# Patient Record
Sex: MALE | Race: OTHER | Employment: FULL TIME | URBAN - METROPOLITAN AREA
[De-identification: names, ages, dates, MRNs, and addresses within clinical notes are randomized per-mention and may not be internally consistent; named-entity substitution may affect disease eponyms.]

---

## 2017-06-30 ENCOUNTER — OFFICE VISIT (OUTPATIENT)
Dept: FAMILY MEDICINE CLINIC | Facility: CLINIC | Age: 47
End: 2017-06-30

## 2017-06-30 VITALS
HEIGHT: 65 IN | TEMPERATURE: 98 F | DIASTOLIC BLOOD PRESSURE: 86 MMHG | WEIGHT: 168 LBS | SYSTOLIC BLOOD PRESSURE: 132 MMHG | BODY MASS INDEX: 27.99 KG/M2 | HEART RATE: 72 BPM

## 2017-06-30 DIAGNOSIS — G51.0 BELL'S PALSY: Primary | ICD-10-CM

## 2017-06-30 PROCEDURE — 99212 OFFICE O/P EST SF 10 MIN: CPT | Performed by: FAMILY MEDICINE

## 2017-06-30 PROCEDURE — 99204 OFFICE O/P NEW MOD 45 MIN: CPT | Performed by: FAMILY MEDICINE

## 2017-06-30 RX ORDER — ACYCLOVIR 800 MG/1
800 TABLET ORAL 2 TIMES DAILY
COMMUNITY
End: 2017-07-10

## 2017-06-30 RX ORDER — METHYLPREDNISOLONE 4 MG/1
4 TABLET ORAL DAILY
COMMUNITY
End: 2017-07-10

## 2017-06-30 RX ORDER — IBUPROFEN 600 MG/1
600 TABLET ORAL EVERY 6 HOURS PRN
Qty: 60 TABLET | Refills: 0 | Status: SHIPPED | OUTPATIENT
Start: 2017-06-30 | End: 2017-08-19 | Stop reason: ALTCHOICE

## 2017-06-30 NOTE — PROGRESS NOTES
6/30/2017  12:04 PM    Sakshi Dolan is a 41year old male. Chief complaint(s): Patient presents with:  ER F/U: ER follow up for Bell's palsy    HPI:     Sakshi Dolan primary complaint is regarding Bell's palsy.      Patient is a 46-year-old male who presents pain and palpitations. Gastrointestinal: Negative for nausea, vomiting, abdominal pain, diarrhea and constipation. Musculoskeletal: Negative for back pain and neck pain. Skin: Negative for rash.    Neurological: Positive for facial asymmetry, numbness for Pain. Always take it with food. REFERRALS: 1PHYSICAL THERAPY - INTERNAL,       PHYSICAL THERAPY - at [facility].       RECOMMENDATIONS given include: Please, call our office with any questions or concerns.  Notify Dr Juliocesar Bhagat or the [employer]

## 2017-07-10 ENCOUNTER — OFFICE VISIT (OUTPATIENT)
Dept: FAMILY MEDICINE CLINIC | Facility: CLINIC | Age: 47
End: 2017-07-10

## 2017-07-10 ENCOUNTER — TELEPHONE (OUTPATIENT)
Dept: INTERNAL MEDICINE CLINIC | Facility: CLINIC | Age: 47
End: 2017-07-10

## 2017-07-10 VITALS
WEIGHT: 168 LBS | DIASTOLIC BLOOD PRESSURE: 73 MMHG | HEIGHT: 65 IN | SYSTOLIC BLOOD PRESSURE: 118 MMHG | TEMPERATURE: 98 F | HEART RATE: 70 BPM | BODY MASS INDEX: 27.99 KG/M2

## 2017-07-10 DIAGNOSIS — G51.0 BELL'S PALSY: Primary | ICD-10-CM

## 2017-07-10 DIAGNOSIS — F51.01 PRIMARY INSOMNIA: ICD-10-CM

## 2017-07-10 PROCEDURE — 99214 OFFICE O/P EST MOD 30 MIN: CPT | Performed by: FAMILY MEDICINE

## 2017-07-10 PROCEDURE — 99212 OFFICE O/P EST SF 10 MIN: CPT | Performed by: FAMILY MEDICINE

## 2017-07-10 RX ORDER — TRAMADOL HYDROCHLORIDE 50 MG/1
50 TABLET ORAL EVERY 6 HOURS PRN
Qty: 60 TABLET | Refills: 0 | Status: SHIPPED | OUTPATIENT
Start: 2017-07-10 | End: 2017-08-19 | Stop reason: ALTCHOICE

## 2017-07-10 RX ORDER — TEMAZEPAM 30 MG/1
30 CAPSULE ORAL NIGHTLY PRN
Qty: 30 CAPSULE | Refills: 0 | Status: SHIPPED | OUTPATIENT
Start: 2017-07-10 | End: 2017-08-19 | Stop reason: ALTCHOICE

## 2017-07-10 NOTE — TELEPHONE ENCOUNTER
Actions Requested:   #[id number]. Diagnosed with Bell's Palsy, still cannot close the right eye, and he needs more days off work. Also having pain in right shoulder and ear. Appointment given today at 3:15 with Dr. Mary Radford.   Problem:  Diagnosed arm, or leg on one side of the body, numbness of the face, arm, or leg on one side of the body, loss of speech or garbled speech  * Sounds like a life-threatening emergency to the triager  * Headache (with neurologic deficit)  * Can't use hand normally (e.

## 2017-07-10 NOTE — PROGRESS NOTES
7/10/2017  4:28 PM    Arpita Castillo is a 41year old male. Chief complaint(s): Patient presents with:  Bell's Palsy: follow up. Pain: behind head and ear. R     HPI:     Arpita Castillo primary complaint is regarding Bell's palsy.      Patient is a 47-year-old Negative for chills, fatigue and fever. HENT: Negative for rhinorrhea and sore throat. Respiratory: Negative for cough, shortness of breath and wheezing. Cardiovascular: Negative for chest pain and palpitations.    Gastrointestinal: Negative for cece tablet 0      Sig: Take 1 tablet (50 mg total) by mouth every 6 (six) hours as needed for Pain.      temazepam (RESTORIL) 30 MG Oral Cap 30 capsule 0      Sig: Take 1 capsule (30 mg total) by mouth nightly as needed for Sleep.          REFERRALS: Follow up

## 2017-07-24 ENCOUNTER — OFFICE VISIT (OUTPATIENT)
Dept: FAMILY MEDICINE CLINIC | Facility: CLINIC | Age: 47
End: 2017-07-24

## 2017-07-24 VITALS
DIASTOLIC BLOOD PRESSURE: 66 MMHG | HEART RATE: 73 BPM | BODY MASS INDEX: 28.16 KG/M2 | HEIGHT: 65 IN | WEIGHT: 169 LBS | TEMPERATURE: 99 F | SYSTOLIC BLOOD PRESSURE: 113 MMHG

## 2017-07-24 DIAGNOSIS — G51.0 BELL'S PALSY: Primary | ICD-10-CM

## 2017-07-24 PROCEDURE — 99214 OFFICE O/P EST MOD 30 MIN: CPT | Performed by: FAMILY MEDICINE

## 2017-07-24 PROCEDURE — 99212 OFFICE O/P EST SF 10 MIN: CPT | Performed by: FAMILY MEDICINE

## 2017-07-24 NOTE — PROGRESS NOTES
7/24/2017  2:57 PM    Gume Townsend is a 41year old male. Chief complaint(s): Patient presents with:  Note For Work: Patient requesting a note to return to work    HPI:     Gume Townsend primary complaint is regarding facial paralysis.      Patient is a 45-ye food. Disp: 60 tablet Rfl: 0       Allergies:  No Known Allergies      ROS:   Review of Systems   Constitutional: Negative for chills, fatigue and fever. HENT: Negative for rhinorrhea and sore throat.     Respiratory: Negative for cough, shortness of riya encounter         REFERRALS: continue with physical therapy. RECOMMENDATIONS given include: Please, call our office with any questions or concerns.  Notify Dr Erickson Kowalski or the [address] [place] [address] Angelita Guerrero if there is a deterioration or worsening of the medical cond

## 2017-08-18 ENCOUNTER — MED REC SCAN ONLY (OUTPATIENT)
Dept: FAMILY MEDICINE CLINIC | Facility: CLINIC | Age: 47
End: 2017-08-18

## 2017-08-19 ENCOUNTER — OFFICE VISIT (OUTPATIENT)
Dept: FAMILY MEDICINE CLINIC | Facility: CLINIC | Age: 47
End: 2017-08-19

## 2017-08-19 VITALS
WEIGHT: 168 LBS | BODY MASS INDEX: 27.99 KG/M2 | HEIGHT: 65 IN | HEART RATE: 71 BPM | SYSTOLIC BLOOD PRESSURE: 107 MMHG | DIASTOLIC BLOOD PRESSURE: 76 MMHG | TEMPERATURE: 98 F

## 2017-08-19 DIAGNOSIS — G51.0 BELL'S PALSY: Primary | ICD-10-CM

## 2017-08-19 PROCEDURE — 99214 OFFICE O/P EST MOD 30 MIN: CPT | Performed by: FAMILY MEDICINE

## 2017-08-19 PROCEDURE — 99212 OFFICE O/P EST SF 10 MIN: CPT | Performed by: FAMILY MEDICINE

## 2017-08-19 NOTE — PROGRESS NOTES
8/19/2017  10:02 AM    Valerio Colby is a 41year old male. Chief complaint(s): Patient presents with: Follow - Up: Patient here to f/u on his Bell's Palsy. Bell's Palsy    HPI:     Valerio Colby primary complaint is regarding Bell's palsy.      Patient is Negative for chest pain and palpitations. Gastrointestinal: Negative for nausea, vomiting, abdominal pain, diarrhea and constipation. Musculoskeletal: Negative for back pain and neck pain. Skin: Negative for rash.    Neurological: Negative for seizure glasses. FOLLOW-UP: Schedule a follow-up visit in 4 weeks. Orders This Visit:  No orders of the defined types were placed in this encounter.       Meds This Visit:    No prescriptions requested or ordered in this encounter    Imaging & Referrals:  P

## 2017-09-18 ENCOUNTER — OFFICE VISIT (OUTPATIENT)
Dept: FAMILY MEDICINE CLINIC | Facility: CLINIC | Age: 47
End: 2017-09-18

## 2017-09-18 VITALS
WEIGHT: 168 LBS | BODY MASS INDEX: 27.99 KG/M2 | TEMPERATURE: 98 F | DIASTOLIC BLOOD PRESSURE: 70 MMHG | HEART RATE: 71 BPM | SYSTOLIC BLOOD PRESSURE: 123 MMHG | RESPIRATION RATE: 16 BRPM | HEIGHT: 65 IN

## 2017-09-18 DIAGNOSIS — G51.0 BELL'S PALSY: Primary | ICD-10-CM

## 2017-09-18 PROCEDURE — 99212 OFFICE O/P EST SF 10 MIN: CPT | Performed by: FAMILY MEDICINE

## 2017-09-18 PROCEDURE — 99214 OFFICE O/P EST MOD 30 MIN: CPT | Performed by: FAMILY MEDICINE

## 2017-09-18 RX ORDER — MINERAL OIL, PETROLATUM 425; 568 MG/G; MG/G
OINTMENT OPHTHALMIC 2 TIMES DAILY
COMMUNITY
End: 2017-12-12

## 2017-09-18 NOTE — PROGRESS NOTES
9/18/2017  6:08 PM    Marcelo Trujillo is a 41year old male. Chief complaint(s): Patient presents with:  Bell's Palsy: 4 week follow up, Pt states her is getting better since LOV    HPI:     Marcelo Trujillo primary complaint is regarding Bell's Palsy.      Patient rhinorrhea and sore throat. Respiratory: Negative for cough, shortness of breath and wheezing. Cardiovascular: Negative for chest pain and palpitations. Gastrointestinal: Negative for nausea, vomiting, abdominal pain, diarrhea and constipation.    [facility] new symptoms or medications' side effects. Do exercise at home instructed by myself and PT. FOLLOW-UP: Schedule a follow-up visit in 4 weeks. Orders This Visit:  No orders of the defined types were placed in this encounter.       Meds This Visit

## 2017-11-21 ENCOUNTER — OFFICE VISIT (OUTPATIENT)
Dept: FAMILY MEDICINE CLINIC | Facility: CLINIC | Age: 47
End: 2017-11-21

## 2017-11-21 VITALS
SYSTOLIC BLOOD PRESSURE: 136 MMHG | HEIGHT: 65 IN | WEIGHT: 168 LBS | DIASTOLIC BLOOD PRESSURE: 80 MMHG | BODY MASS INDEX: 27.99 KG/M2 | TEMPERATURE: 98 F | HEART RATE: 74 BPM

## 2017-11-21 DIAGNOSIS — G51.0 BELL'S PALSY: Primary | ICD-10-CM

## 2017-11-21 DIAGNOSIS — K59.01 SLOW TRANSIT CONSTIPATION: ICD-10-CM

## 2017-11-21 PROCEDURE — 99212 OFFICE O/P EST SF 10 MIN: CPT | Performed by: FAMILY MEDICINE

## 2017-11-21 PROCEDURE — 99214 OFFICE O/P EST MOD 30 MIN: CPT | Performed by: FAMILY MEDICINE

## 2017-11-21 NOTE — PROGRESS NOTES
11/21/2017  1:28 PM    Mari Thorpe is a 41year old male. Chief complaint(s): Patient presents with: Follow - Up: Patient here to f/u on his bells palsy    HPI:     Mari Thorpe primary complaint is regarding Bell's palsy.      Patient is a 70-year-old mal chills, fatigue and fever. HENT: Negative for rhinorrhea and sore throat. Respiratory: Negative for cough, shortness of breath and wheezing. Cardiovascular: Negative for chest pain and palpitations. Gastrointestinal: Positive for constipation.  Ne Neuro Referral - In Network      MRI BRAIN (W+WO) (CPT=70553). RECOMMENDATIONS given include: Please, call our office with any questions or concerns.  Notify Dr Akash Valdovinos or the [facility], [facility] if there is a deterioration or worsening of the medical co

## 2017-11-28 ENCOUNTER — HOSPITAL ENCOUNTER (OUTPATIENT)
Dept: MRI IMAGING | Age: 47
Discharge: HOME | End: 2017-11-28
Attending: FAMILY MEDICINE
Payer: COMMERCIAL

## 2017-11-28 DIAGNOSIS — G51.0 BELL'S PALSY: ICD-10-CM

## 2017-11-28 PROCEDURE — 70553 MRI BRAIN STEM W/O & W/DYE: CPT | Performed by: FAMILY MEDICINE

## 2017-11-28 PROCEDURE — A9575 INJ GADOTERATE MEGLUMI 0.1ML: HCPCS | Performed by: FAMILY MEDICINE

## 2017-12-06 ENCOUNTER — OFFICE VISIT (OUTPATIENT)
Dept: NEUROLOGY | Facility: CLINIC | Age: 47
End: 2017-12-06

## 2017-12-06 ENCOUNTER — APPOINTMENT (OUTPATIENT)
Dept: LAB | Facility: HOSPITAL | Age: 47
End: 2017-12-06
Attending: Other
Payer: COMMERCIAL

## 2017-12-06 VITALS
BODY MASS INDEX: 27.99 KG/M2 | HEIGHT: 65 IN | WEIGHT: 168 LBS | SYSTOLIC BLOOD PRESSURE: 116 MMHG | HEART RATE: 64 BPM | DIASTOLIC BLOOD PRESSURE: 86 MMHG | RESPIRATION RATE: 16 BRPM

## 2017-12-06 DIAGNOSIS — G51.32 CLONIC HEMIFACIAL SPASM OF MUSCLE OF LEFT SIDE OF FACE: ICD-10-CM

## 2017-12-06 DIAGNOSIS — G51.0 BELL PALSY: ICD-10-CM

## 2017-12-06 DIAGNOSIS — G51.0 BELL PALSY: Primary | ICD-10-CM

## 2017-12-06 PROCEDURE — 36415 COLL VENOUS BLD VENIPUNCTURE: CPT

## 2017-12-06 PROCEDURE — 86618 LYME DISEASE ANTIBODY: CPT

## 2017-12-06 PROCEDURE — 82164 ANGIOTENSIN I ENZYME TEST: CPT

## 2017-12-06 PROCEDURE — 99204 OFFICE O/P NEW MOD 45 MIN: CPT | Performed by: OTHER

## 2017-12-06 NOTE — PROGRESS NOTES
Neurology Initial Visit     Referred By: Dr. Dixon ref. provider found    Chief Complaint: Patient presents with:  Bell's Palsy: Patient states that he was diagnosed with bell's palsy at the end of June affecting the right side of face.  He states that he wa REFRESH LACRI-LUBE Ophthalmic Ointment, Place into both eyes 2 (two) times daily. , Disp: , Rfl:     No Known Allergies    ROS:   As in HPI, the rest of the 14 system review was done and was negative      Physical Exam:   12/06/17  1515   BP: 116/86   Pulse posture  Normal physiologic      Labs:    No results found for: TSH  No results found for: HDL, LDL, TRIG  No results found for: HGB, HCT, MCV, WBC, PLT   No results found for: GLUCOSE, BUN, CREAT, CA, ALT, AST, ALKPHOS, ALB, NA, K, CL, CO2   I have review

## 2017-12-11 ENCOUNTER — TELEPHONE (OUTPATIENT)
Dept: NEUROLOGY | Facility: CLINIC | Age: 47
End: 2017-12-11

## 2017-12-11 NOTE — TELEPHONE ENCOUNTER
----- Message from Patti Silvestre MD sent at 12/11/2017  8:22 AM CST -----  Please let the patient know that results of the tests so far were normal.    Thank you

## 2017-12-12 ENCOUNTER — OFFICE VISIT (OUTPATIENT)
Dept: FAMILY MEDICINE CLINIC | Facility: CLINIC | Age: 47
End: 2017-12-12

## 2017-12-12 VITALS
WEIGHT: 168 LBS | BODY MASS INDEX: 28 KG/M2 | HEART RATE: 78 BPM | SYSTOLIC BLOOD PRESSURE: 115 MMHG | TEMPERATURE: 99 F | DIASTOLIC BLOOD PRESSURE: 72 MMHG

## 2017-12-12 DIAGNOSIS — G51.0 BELL'S PALSY: Primary | ICD-10-CM

## 2017-12-12 PROCEDURE — 99212 OFFICE O/P EST SF 10 MIN: CPT | Performed by: FAMILY MEDICINE

## 2017-12-12 PROCEDURE — 99213 OFFICE O/P EST LOW 20 MIN: CPT | Performed by: FAMILY MEDICINE

## 2017-12-12 NOTE — PROGRESS NOTES
12/12/2017  3:07 PM    Chaim Soler is a 41year old male. Chief complaint(s): Patient presents with: Follow - Up  Bell's Palsy    HPI:     Chaim Soler primary complaint is regarding as above.      Patient is a 46-year-old male who presents for a follo Negative for nausea, vomiting, abdominal pain, diarrhea and constipation. Musculoskeletal: Negative for back pain and neck pain. Skin: Negative for rash. Neurological: Positive for facial asymmetry and numbness (right facial weaknee).  Negative for se without and with gadolinium contrast.   FINDINGS:  CSF SPACES: The ventricles, cisterns, and sulci are commensurate in caliber and appropriate for age. No hydrocephalus, subarachnoid hemorrhage, or effacement of the basal cisterns is appreciated.  There is abnormal intracranial enhancement.  3. Nonspecific white matter changes involving both cerebral hemispheres that may reflect early sequelae of chronic microangiopathy, residua of migraines headaches, or be related to a remote infectious/inflammatory process

## 2019-06-05 ENCOUNTER — OFFICE VISIT (OUTPATIENT)
Dept: FAMILY MEDICINE CLINIC | Facility: CLINIC | Age: 49
End: 2019-06-05
Payer: COMMERCIAL

## 2019-06-05 VITALS
BODY MASS INDEX: 27.79 KG/M2 | DIASTOLIC BLOOD PRESSURE: 80 MMHG | WEIGHT: 166.81 LBS | TEMPERATURE: 99 F | HEART RATE: 82 BPM | HEIGHT: 65 IN | SYSTOLIC BLOOD PRESSURE: 121 MMHG

## 2019-06-05 DIAGNOSIS — M54.32 SCIATICA OF LEFT SIDE: Primary | ICD-10-CM

## 2019-06-05 PROCEDURE — 99213 OFFICE O/P EST LOW 20 MIN: CPT | Performed by: FAMILY MEDICINE

## 2019-06-05 PROCEDURE — 99212 OFFICE O/P EST SF 10 MIN: CPT | Performed by: FAMILY MEDICINE

## 2019-06-05 RX ORDER — METAXALONE 800 MG/1
800 TABLET ORAL 3 TIMES DAILY
Qty: 30 TABLET | Refills: 1 | Status: SHIPPED | OUTPATIENT
Start: 2019-06-05 | End: 2019-06-24

## 2019-06-05 RX ORDER — IBUPROFEN 600 MG/1
600 TABLET ORAL EVERY 6 HOURS PRN
Qty: 60 TABLET | Refills: 0 | Status: SHIPPED | OUTPATIENT
Start: 2019-06-05 | End: 2019-08-20

## 2019-06-05 NOTE — PROGRESS NOTES
6/5/2019  12:30 PM    Maria Fernanda Bush is a 54year old male. Chief complaint(s): Patient presents with:  Leg Pain: left x 1 1/2wks    HPI:     Maria Fernanda Bush primary complaint is regarding back pain-leg pain.      Maria Fernanda Bush is a 54year old male present  c tablet Rfl: 1       Allergies:  No Known Allergies      ROS:   Review of Systems   Constitutional: Negative for chills, fatigue and fever. HENT: Negative for rhinorrhea. Respiratory: Negative for shortness of breath.     Cardiovascular: Negative for ch or concerns. Notify Dr Shanta Canales or the [facility], [facility] if there is a deterioration or worsening of the medical condition. Also, inform the doctor with any new symptoms or medications' side effects. RICE therapy.      FOLLOW-UP: Schedule a follow-up visit i

## 2019-06-12 ENCOUNTER — HOSPITAL ENCOUNTER (OUTPATIENT)
Dept: MRI IMAGING | Age: 49
Discharge: HOME | End: 2019-06-12
Attending: FAMILY MEDICINE
Payer: COMMERCIAL

## 2019-06-12 DIAGNOSIS — M54.32 SCIATICA OF LEFT SIDE: ICD-10-CM

## 2019-06-12 PROCEDURE — 72148 MRI LUMBAR SPINE W/O DYE: CPT | Performed by: FAMILY MEDICINE

## 2019-06-18 ENCOUNTER — OFFICE VISIT (OUTPATIENT)
Dept: FAMILY MEDICINE CLINIC | Facility: CLINIC | Age: 49
End: 2019-06-18
Payer: COMMERCIAL

## 2019-06-18 VITALS
TEMPERATURE: 98 F | BODY MASS INDEX: 28.62 KG/M2 | SYSTOLIC BLOOD PRESSURE: 125 MMHG | HEIGHT: 65 IN | HEART RATE: 83 BPM | DIASTOLIC BLOOD PRESSURE: 76 MMHG | WEIGHT: 171.81 LBS

## 2019-06-18 DIAGNOSIS — M54.32 SCIATICA OF LEFT SIDE: Primary | ICD-10-CM

## 2019-06-18 DIAGNOSIS — M48.07 SPINAL STENOSIS OF LUMBOSACRAL REGION: ICD-10-CM

## 2019-06-18 PROCEDURE — 99212 OFFICE O/P EST SF 10 MIN: CPT | Performed by: FAMILY MEDICINE

## 2019-06-18 PROCEDURE — 99213 OFFICE O/P EST LOW 20 MIN: CPT | Performed by: FAMILY MEDICINE

## 2019-06-24 ENCOUNTER — OFFICE VISIT (OUTPATIENT)
Dept: FAMILY MEDICINE CLINIC | Facility: CLINIC | Age: 49
End: 2019-06-24
Payer: COMMERCIAL

## 2019-06-24 VITALS
HEIGHT: 65 IN | SYSTOLIC BLOOD PRESSURE: 132 MMHG | HEART RATE: 78 BPM | BODY MASS INDEX: 28.49 KG/M2 | TEMPERATURE: 98 F | WEIGHT: 171 LBS | DIASTOLIC BLOOD PRESSURE: 89 MMHG

## 2019-06-24 DIAGNOSIS — M54.32 LEFT SIDED SCIATICA: ICD-10-CM

## 2019-06-24 DIAGNOSIS — M48.061 SPINAL STENOSIS OF LUMBAR REGION, UNSPECIFIED WHETHER NEUROGENIC CLAUDICATION PRESENT: Primary | ICD-10-CM

## 2019-06-24 PROCEDURE — 99213 OFFICE O/P EST LOW 20 MIN: CPT | Performed by: NURSE PRACTITIONER

## 2019-06-24 RX ORDER — IBUPROFEN 600 MG/1
600 TABLET ORAL EVERY 6 HOURS PRN
Qty: 40 TABLET | Refills: 0 | Status: SHIPPED | OUTPATIENT
Start: 2019-06-24 | End: 2019-07-02

## 2019-06-24 NOTE — ASSESSMENT & PLAN NOTE
Continue therapy. Call for an appt with Dr Kehinde Zafar. Ibuprofen as needed for pain. Letter for work for one week. Return on Monday with Dr Bony Robbins for a follow up.

## 2019-06-24 NOTE — ASSESSMENT & PLAN NOTE
Continue therapy. Call for an appt with Dr Liz Callaway. Ibuprofen as needed for pain. Letter for work for one week. Return on Monday with Dr Francesco Rivera for a follow up.

## 2019-06-24 NOTE — PROGRESS NOTES
HPI    Patient presents for follow up for back pain with left sided sciatica. Was initially seen on 6/5 for left side sciatica and given an order for PT as well as an MRI. Ibuprofen and skelaxin given for pain management.   He followed up on 6/18 and was Surgical History:   Procedure Laterality Date   • Umbilical hernia repair  2013       Family History   Problem Relation Age of Onset   • Hypertension Father    • Lipids Father         Hyperlipidemia   • Stroke Mother         Cerebrovascular Accident   • He ibuprofen 600 MG Oral Tab Take 1 tablet (600 mg total) by mouth every 6 (six) hours as needed for Pain. Always take it with food. Disp: 60 tablet Rfl: 0       Allergies:  No Known Allergies    Physical Exam   Nursing note and vitals reviewed.    [employer]

## 2019-06-24 NOTE — PATIENT INSTRUCTIONS
¿Qué es la radiculopatía lumbar? La radiculopatía lumbar es la irritación o inflamación de [place] nerviosa en la parte baja de la espalda. Causa síntomas que se propagan desde la espalda, bajando por juventino o ambas piernas.  Para entender esta afección, · Degeneración a causa del desgaste y el envejecimiento. [facility] puede ocasionar un estrechamiento (estenosis) de las aberturas [employer].  Las aberturas que se alvarado estrechado presionan sobre las raíces nerviosas cuando estas salen del canal voss. Con el tiempo, un nervio irritado e inflamado puede dañarse. Eso puede ocasionar entumecimiento o debilidad a kevin plazo (permanente) en koby piernas y koby pies.  Si koby síntomas cambian de manera repentina o empeoran, asegúrese de comentárselo a mendez proveed

## 2019-07-02 ENCOUNTER — OFFICE VISIT (OUTPATIENT)
Dept: FAMILY MEDICINE CLINIC | Facility: CLINIC | Age: 49
End: 2019-07-02
Payer: COMMERCIAL

## 2019-07-02 VITALS
SYSTOLIC BLOOD PRESSURE: 133 MMHG | TEMPERATURE: 99 F | DIASTOLIC BLOOD PRESSURE: 78 MMHG | HEART RATE: 91 BPM | BODY MASS INDEX: 28.16 KG/M2 | HEIGHT: 65 IN | WEIGHT: 169 LBS

## 2019-07-02 DIAGNOSIS — M54.32 SCIATICA OF LEFT SIDE: ICD-10-CM

## 2019-07-02 DIAGNOSIS — M48.061 SPINAL STENOSIS OF LUMBAR REGION, UNSPECIFIED WHETHER NEUROGENIC CLAUDICATION PRESENT: Primary | ICD-10-CM

## 2019-07-02 PROCEDURE — 99213 OFFICE O/P EST LOW 20 MIN: CPT | Performed by: FAMILY MEDICINE

## 2019-07-02 NOTE — PROGRESS NOTES
7/2/2019  2:31 PM    Temitope Graves is a 54year old male.     Chief complaint(s): Patient presents with:  Sciatica: f/u on sciatica of the left side, not working was seen by DONTRELL Littlejohn last week for extension on note    HPI:     Temitope Graves primary complaint Comment: Negative       Immunizations: There is no immunization history on file for this patient.     Medications (Active prior to today's visit):    Current Outpatient Medications:  ibuprofen 600 MG Oral Tab Take 1 tablet (600 mg total) by mouth every suspicious osseous lesion is evident. CORD/CAUDA EQUINA: The conus medullaris terminates at the level of the inferior L1 endplate. The distal cord and nerve roots have normal caliber, contour, and signal intensity. PARASPINAL AREA: No visible mass.   OTHER: Prn/ KPA. Orders This Visit:  No orders of the defined types were placed in this encounter.       Meds This Visit:  Requested Prescriptions      No prescriptions requested or ordered in this encounter       Imaging & Referrals:  None         JAIDA

## 2019-08-20 ENCOUNTER — OFFICE VISIT (OUTPATIENT)
Dept: FAMILY MEDICINE CLINIC | Facility: CLINIC | Age: 49
End: 2019-08-20
Payer: COMMERCIAL

## 2019-08-20 VITALS
DIASTOLIC BLOOD PRESSURE: 82 MMHG | HEART RATE: 75 BPM | SYSTOLIC BLOOD PRESSURE: 123 MMHG | WEIGHT: 171.19 LBS | HEIGHT: 65 IN | BODY MASS INDEX: 28.52 KG/M2 | TEMPERATURE: 98 F

## 2019-08-20 DIAGNOSIS — M54.32 SCIATICA OF LEFT SIDE: ICD-10-CM

## 2019-08-20 DIAGNOSIS — M48.061 SPINAL STENOSIS OF LUMBAR REGION, UNSPECIFIED WHETHER NEUROGENIC CLAUDICATION PRESENT: Primary | ICD-10-CM

## 2019-08-20 PROCEDURE — 99213 OFFICE O/P EST LOW 20 MIN: CPT | Performed by: FAMILY MEDICINE

## 2019-08-20 RX ORDER — IBUPROFEN 600 MG/1
600 TABLET ORAL EVERY 6 HOURS PRN
Qty: 60 TABLET | Refills: 0 | Status: SHIPPED | OUTPATIENT
Start: 2019-08-20

## 2019-08-20 NOTE — PROGRESS NOTES
8/20/2019  4:15 PM    Dalton You is a 54year old male. Chief complaint(s): Patient presents with: Follow - Up: f/u spinal stenosis     HPI:     Dalton You primary complaint is regarding sciatica.      Ede De La O a 54year old male present for There is no immunization history on file for this patient. Medications (Active prior to today's visit):    Current Outpatient Medications:  ibuprofen 600 MG Oral Tab Take 1 tablet (600 mg total) by mouth every 6 (six) hours as needed for Pain.  Alway deterioration or worsening of the medical condition. Also, inform the doctor with any new symptoms or medications' side effects. No heavy lifting. FOLLOW-UP: Schedule a follow-up visit in 3 months / prn.        Orders This Visit:  No orders of the defin

## 2019-10-29 ENCOUNTER — OFFICE VISIT (OUTPATIENT)
Dept: FAMILY MEDICINE CLINIC | Facility: CLINIC | Age: 49
End: 2019-10-29
Payer: COMMERCIAL

## 2019-10-29 VITALS
HEART RATE: 64 BPM | WEIGHT: 167.81 LBS | DIASTOLIC BLOOD PRESSURE: 79 MMHG | BODY MASS INDEX: 27.96 KG/M2 | HEIGHT: 65 IN | TEMPERATURE: 98 F | SYSTOLIC BLOOD PRESSURE: 119 MMHG

## 2019-10-29 DIAGNOSIS — B34.9 ACUTE VIRAL SYNDROME: ICD-10-CM

## 2019-10-29 DIAGNOSIS — M48.061 SPINAL STENOSIS OF LUMBAR REGION, UNSPECIFIED WHETHER NEUROGENIC CLAUDICATION PRESENT: Primary | ICD-10-CM

## 2019-10-29 DIAGNOSIS — M54.32 SCIATICA OF LEFT SIDE: ICD-10-CM

## 2019-10-29 PROCEDURE — 99213 OFFICE O/P EST LOW 20 MIN: CPT | Performed by: FAMILY MEDICINE

## 2019-10-29 RX ORDER — CODEINE PHOSPHATE AND GUAIFENESIN 10; 100 MG/5ML; MG/5ML
5 SOLUTION ORAL EVERY 6 HOURS PRN
Qty: 120 ML | Refills: 1 | Status: SHIPPED | OUTPATIENT
Start: 2019-10-29 | End: 2019-12-31 | Stop reason: ALTCHOICE

## 2019-10-29 NOTE — PROGRESS NOTES
10/29/2019 3:05 PM    Lucia Seema, : 1970  Patient presents with:  Chest Congestion: x 2 wks    HPI:     Lucia Stephenson is a 54year old male who presents for evaluation of a chief complaint of cough which is  dry and chest tightness or congestion. allergies, frequent URI-type illnesses and history of serious infectious illnesses. NEUROLOGICAL: negative for headaches.     Past Medical History:   Past Medical History:   Diagnosis Date   • Bell's palsy    • Hyperlipidemia     dx'd in April 2014   • L immunization history on file for this patient. Allergies: No Known Allergies    Medications: ibuprofen 600 MG Oral Tab, Take 1 tablet (600 mg total) by mouth every 6 (six) hours as needed for Pain. Always take it with food. , Disp: 60 tablet, Rfl: 0    N condition. Also, inform the doctor with any new symptoms or medications' side effects. .    FOLLOW-UP:  Instructed to call if new or worsening symptoms develop. Schedule a follow-up appointment  prn.          Orders Placed This Encounter      guaiFENesin-co

## 2019-12-31 ENCOUNTER — OFFICE VISIT (OUTPATIENT)
Dept: FAMILY MEDICINE CLINIC | Facility: CLINIC | Age: 49
End: 2019-12-31
Payer: COMMERCIAL

## 2019-12-31 VITALS
DIASTOLIC BLOOD PRESSURE: 71 MMHG | HEIGHT: 65 IN | BODY MASS INDEX: 27.82 KG/M2 | SYSTOLIC BLOOD PRESSURE: 108 MMHG | HEART RATE: 65 BPM | WEIGHT: 167 LBS | TEMPERATURE: 98 F

## 2019-12-31 DIAGNOSIS — Z00.00 PHYSICAL EXAM: Primary | ICD-10-CM

## 2019-12-31 PROCEDURE — 99396 PREV VISIT EST AGE 40-64: CPT | Performed by: FAMILY MEDICINE

## 2019-12-31 NOTE — PROGRESS NOTES
12/31/2019  10:17 AM    Maria Fernanda Bush is a 54year old male. Chief complaint(s): Patient presents with:  Routine Physical: Pt is not fasting    HPI:     Maria Fernnada Bush primary complaint is regarding CPE.      Maria Fernanda Bush is a 54year old male is here for r HENT: Negative for hearing loss and tinnitus. Eyes: Negative for visual disturbance. Respiratory: Negative for apnea, shortness of breath and wheezing. Cardiovascular: Negative for chest pain and palpitations.    Gastrointestinal: Negative for hea negative in the right inguinal area and confirmed negative in the left inguinal area. Genitourinary:    Penis normal.   Uncircumcised. Musculoskeletal:      Comments: Spinal exam without scoliosis.       Lymphadenopathy:   Negative for cervical, axillar if overweight and/or having difficult in staying active; attempt to keep a schedule that includes an adequate sleep and physical exercise / activities Patient educated on doing regular self testicular exam. Patient was educated on sexual transmitted diseas

## 2020-01-18 ENCOUNTER — APPOINTMENT (OUTPATIENT)
Dept: LAB | Age: 50
End: 2020-01-18
Attending: FAMILY MEDICINE
Payer: COMMERCIAL

## 2020-01-18 ENCOUNTER — LAB ENCOUNTER (OUTPATIENT)
Dept: LAB | Age: 50
End: 2020-01-18
Attending: FAMILY MEDICINE
Payer: COMMERCIAL

## 2020-01-18 DIAGNOSIS — Z00.00 PHYSICAL EXAM: ICD-10-CM

## 2020-01-18 LAB
ALBUMIN SERPL-MCNC: 3.1 G/DL (ref 3.4–5)
ALBUMIN/GLOB SERPL: 0.7 {RATIO} (ref 1–2)
ALP LIVER SERPL-CCNC: 100 U/L (ref 45–117)
ALT SERPL-CCNC: 43 U/L (ref 16–61)
ANION GAP SERPL CALC-SCNC: 4 MMOL/L (ref 0–18)
AST SERPL-CCNC: 24 U/L (ref 15–37)
BACTERIA UR QL AUTO: NEGATIVE /HPF
BACTERIA UR QL AUTO: NEGATIVE /HPF
BASOPHILS # BLD AUTO: 0.05 X10(3) UL (ref 0–0.2)
BASOPHILS NFR BLD AUTO: 0.8 %
BILIRUB SERPL-MCNC: 0.6 MG/DL (ref 0.1–2)
BILIRUB UR QL: NEGATIVE
BUN BLD-MCNC: 22 MG/DL (ref 7–18)
BUN/CREAT SERPL: 17.9 (ref 10–20)
CALCIUM BLD-MCNC: 9.2 MG/DL (ref 8.5–10.1)
CHLORIDE SERPL-SCNC: 106 MMOL/L (ref 98–112)
CHOLEST SMN-MCNC: 244 MG/DL (ref ?–200)
CLARITY UR: CLEAR
CO2 SERPL-SCNC: 29 MMOL/L (ref 21–32)
COLOR UR: YELLOW
CREAT BLD-MCNC: 1.23 MG/DL (ref 0.7–1.3)
DEPRECATED RDW RBC AUTO: 41.2 FL (ref 35.1–46.3)
EOSINOPHIL # BLD AUTO: 0.16 X10(3) UL (ref 0–0.7)
EOSINOPHIL NFR BLD AUTO: 2.5 %
ERYTHROCYTE [DISTWIDTH] IN BLOOD BY AUTOMATED COUNT: 12.9 % (ref 11–15)
EST. AVERAGE GLUCOSE BLD GHB EST-MCNC: 103 MG/DL (ref 68–126)
GLOBULIN PLAS-MCNC: 4.3 G/DL (ref 2.8–4.4)
GLUCOSE BLD-MCNC: 86 MG/DL (ref 70–99)
GLUCOSE UR-MCNC: NEGATIVE MG/DL
HBA1C MFR BLD HPLC: 5.2 % (ref ?–5.7)
HCT VFR BLD AUTO: 51.7 % (ref 39–53)
HDLC SERPL-MCNC: 46 MG/DL (ref 40–59)
HGB BLD-MCNC: 16.8 G/DL (ref 13–17.5)
IMM GRANULOCYTES # BLD AUTO: 0.04 X10(3) UL (ref 0–1)
IMM GRANULOCYTES NFR BLD: 0.6 %
KETONES UR-MCNC: NEGATIVE MG/DL
LDLC SERPL CALC-MCNC: 165 MG/DL (ref ?–100)
LEUKOCYTE ESTERASE UR QL STRIP.AUTO: NEGATIVE
LYMPHOCYTES # BLD AUTO: 2.32 X10(3) UL (ref 1–4)
LYMPHOCYTES NFR BLD AUTO: 35.9 %
M PROTEIN MFR SERPL ELPH: 7.4 G/DL (ref 6.4–8.2)
MCH RBC QN AUTO: 28.5 PG (ref 26–34)
MCHC RBC AUTO-ENTMCNC: 32.5 G/DL (ref 31–37)
MCV RBC AUTO: 87.8 FL (ref 80–100)
MONOCYTES # BLD AUTO: 0.41 X10(3) UL (ref 0.1–1)
MONOCYTES NFR BLD AUTO: 6.3 %
NEUTROPHILS # BLD AUTO: 3.49 X10 (3) UL (ref 1.5–7.7)
NEUTROPHILS # BLD AUTO: 3.49 X10(3) UL (ref 1.5–7.7)
NEUTROPHILS NFR BLD AUTO: 53.9 %
NITRITE UR QL STRIP.AUTO: NEGATIVE
NONHDLC SERPL-MCNC: 198 MG/DL (ref ?–130)
OSMOLALITY SERPL CALC.SUM OF ELEC: 291 MOSM/KG (ref 275–295)
PATIENT FASTING Y/N/NP: YES
PATIENT FASTING Y/N/NP: YES
PH UR: 6 [PH] (ref 5–8)
PLATELET # BLD AUTO: 267 10(3)UL (ref 150–450)
POTASSIUM SERPL-SCNC: 4.4 MMOL/L (ref 3.5–5.1)
PROT UR-MCNC: NEGATIVE MG/DL
RBC # BLD AUTO: 5.89 X10(6)UL (ref 4.3–5.7)
RBC #/AREA URNS AUTO: 2 /HPF
RBC #/AREA URNS AUTO: 2 /HPF
SODIUM SERPL-SCNC: 139 MMOL/L (ref 136–145)
SP GR UR STRIP: 1.02 (ref 1–1.03)
TRIGL SERPL-MCNC: 166 MG/DL (ref 30–149)
TSI SER-ACNC: 2.16 MIU/ML (ref 0.36–3.74)
UROBILINOGEN UR STRIP-ACNC: <2
VLDLC SERPL CALC-MCNC: 33 MG/DL (ref 0–30)
WBC # BLD AUTO: 6.5 X10(3) UL (ref 4–11)
WBC #/AREA URNS AUTO: <1 /HPF
WBC #/AREA URNS AUTO: <1 /HPF

## 2020-01-18 PROCEDURE — 82306 VITAMIN D 25 HYDROXY: CPT | Performed by: FAMILY MEDICINE

## 2020-01-18 PROCEDURE — 93010 ELECTROCARDIOGRAM REPORT: CPT | Performed by: FAMILY MEDICINE

## 2020-01-18 PROCEDURE — 83036 HEMOGLOBIN GLYCOSYLATED A1C: CPT

## 2020-01-18 PROCEDURE — 81001 URINALYSIS AUTO W/SCOPE: CPT | Performed by: FAMILY MEDICINE

## 2020-01-18 PROCEDURE — 93005 ELECTROCARDIOGRAM TRACING: CPT

## 2020-01-18 PROCEDURE — 85025 COMPLETE CBC W/AUTO DIFF WBC: CPT

## 2020-01-18 PROCEDURE — 36415 COLL VENOUS BLD VENIPUNCTURE: CPT

## 2020-01-18 PROCEDURE — 86480 TB TEST CELL IMMUN MEASURE: CPT

## 2020-01-18 PROCEDURE — 81015 MICROSCOPIC EXAM OF URINE: CPT | Performed by: FAMILY MEDICINE

## 2020-01-18 PROCEDURE — 80053 COMPREHEN METABOLIC PANEL: CPT

## 2020-01-18 PROCEDURE — 84443 ASSAY THYROID STIM HORMONE: CPT

## 2020-01-18 PROCEDURE — 80061 LIPID PANEL: CPT

## 2020-01-20 LAB
25(OH)D3 SERPL-MCNC: 12.7 NG/ML (ref 30–100)
M TB IFN-G CD4+ T-CELLS BLD-ACNC: 0.06 IU/ML
M TB TUBERC IFN-G BLD QL: NEGATIVE
M TB TUBERC IGNF/MITOGEN IGNF CONTROL: >10 IU/ML
QUANTIFERON TB1 MINUS NIL: 0.02 IU/ML
QUANTIFERON TB2 MINUS NIL: 0.01 IU/ML

## 2020-01-20 RX ORDER — ERGOCALCIFEROL 1.25 MG/1
50000 CAPSULE ORAL WEEKLY
Qty: 12 CAPSULE | Refills: 4 | Status: SHIPPED | OUTPATIENT
Start: 2020-01-20 | End: 2020-02-19

## 2020-02-10 ENCOUNTER — OFFICE VISIT (OUTPATIENT)
Dept: FAMILY MEDICINE CLINIC | Facility: CLINIC | Age: 50
End: 2020-02-10
Payer: COMMERCIAL

## 2020-02-10 VITALS
SYSTOLIC BLOOD PRESSURE: 115 MMHG | WEIGHT: 169 LBS | HEIGHT: 65 IN | BODY MASS INDEX: 28.16 KG/M2 | DIASTOLIC BLOOD PRESSURE: 78 MMHG | TEMPERATURE: 98 F | HEART RATE: 77 BPM

## 2020-02-10 DIAGNOSIS — E55.9 HYPOVITAMINOSIS D: ICD-10-CM

## 2020-02-10 DIAGNOSIS — F41.9 ANXIETY: ICD-10-CM

## 2020-02-10 DIAGNOSIS — E78.00 PURE HYPERCHOLESTEROLEMIA: Primary | ICD-10-CM

## 2020-02-10 PROCEDURE — 99213 OFFICE O/P EST LOW 20 MIN: CPT | Performed by: FAMILY MEDICINE

## 2020-02-10 RX ORDER — BUSPIRONE HYDROCHLORIDE 5 MG/1
5 TABLET ORAL 3 TIMES DAILY
Qty: 270 TABLET | Refills: 2 | Status: SHIPPED | OUTPATIENT
Start: 2020-02-10 | End: 2021-02-04

## 2020-02-10 RX ORDER — ATORVASTATIN CALCIUM 10 MG/1
10 TABLET, FILM COATED ORAL NIGHTLY
Qty: 90 TABLET | Refills: 3 | Status: SHIPPED | OUTPATIENT
Start: 2020-02-10

## 2020-02-10 NOTE — PROGRESS NOTES
2/10/2020  5:02 PM    Rashad Bazzi is a 54year old male. Chief complaint(s): Patient presents with:  Abnormal Labs: lipids and Vit D abn test results on 1/18/20 f/u  Stress: x 2 wks    HPI:     Rashad Bazzi primary complaint is regarding as above. status: Never Smoker      Smokeless tobacco: Never Used    Alcohol use:  Yes      Alcohol/week: 3.0 standard drinks      Types: 3 Cans of beer per week      Frequency: 2-4 times a month      Comment: 3 or 4 beers per month    Drug use: No      Comment: Ree METABOLIC PANEL (14)   Result Value Ref Range    Glucose 86 70 - 99 mg/dL    Sodium 139 136 - 145 mmol/L    Potassium 4.4 3.5 - 5.1 mmol/L    Chloride 106 98 - 112 mmol/L    CO2 29.0 21.0 - 32.0 mmol/L    Anion Gap 4 0 - 18 mmol/L    BUN 22 (H) 7 - 18 mg/d IU/mL    Quantiferon TB Mitogen minus NIL >10.00 IU/mL    Quantiferon TB Result Negative Negative   CBC W/ DIFFERENTIAL   Result Value Ref Range    WBC 6.5 4.0 - 11.0 x10(3) uL    RBC 5.89 (H) 4.30 - 5.70 x10(6)uL    HGB 16.8 13.0 - 17.5 g/dL    HCT 51.7 3 doctor with any new symptoms or medications' side effects. REFUSALS:  none. FOLLOW-UP: Schedule a follow-up visit in 12 months.     3. Anxiety    MEDICATIONS:     Requested Prescriptions     Signed Prescriptions Disp Refills   • busPIRone HCl 5 MG Ora

## 2021-01-19 RX ORDER — ATORVASTATIN CALCIUM 10 MG/1
TABLET, FILM COATED ORAL
Qty: 90 TABLET | Refills: 3 | OUTPATIENT
Start: 2021-01-19

## 2021-02-21 RX ORDER — ERGOCALCIFEROL 1.25 MG/1
CAPSULE ORAL
Qty: 12 CAPSULE | Refills: 4 | OUTPATIENT
Start: 2021-02-21

## 2022-09-16 NOTE — PROGRESS NOTES
6/18/2019  2:15 PM    Lonny Duggan is a 54year old male. Chief complaint(s): Patient presents with:  Test Results: MRI test resultls     HPI:     Lonny Duggan primary complaint is regarding back pain.      Lonny Duggan is a 54year old male present for Pt lm on [place] vm stating that the med went to [facility] & [facility] needs [facility] today's visit):    Current Outpatient Medications:  ibuprofen 600 MG Oral Tab Take 1 tablet (600 mg total) by mouth every 6 (six) hours as needed for Pain. Always take it with food.  Disp: 60 tablet Rfl: 0   Metaxalone (SKELAXIN) 800 MG Oral Tab Take 1 tabl lesion is evident. CORD/CAUDA EQUINA: The conus medullaris terminates at the level of the inferior L1 endplate. The distal cord and nerve roots have normal caliber, contour, and signal intensity. PARASPINAL AREA: No visible mass. OTHER: Negative.   LUMBAR prn.           Orders This Visit:  No orders of the defined types were placed in this encounter.       Meds This Visit:  Requested Prescriptions      No prescriptions requested or ordered in this encounter       Imaging & Referrals:  NEUROSURGERY - EXTERNAL

## (undated) NOTE — LETTER
9/18/2017              Aris Hays        [address] [facility]         To Whom it may concern:     This is to certify that Aris Hays had an appointment on 9/18/2017 at 6:22 PM with Bertha Samuels MD.  He should avoid worki

## (undated) NOTE — LETTER
6/30/2017        To Whom It May Concern:    Galen Red is currently under my medical care and may not return to work at this time. Please excuse Master for 10 days. He may return to work on 07/10/17.   Activity is restricted as follows: none, use eye glas

## (undated) NOTE — LETTER
7/2/2019          To Whom It May Concern:    Maria Fernanda Bush is currently under my medical care and may return to work at this time. He may return to work on 07/03/19. Activity is restricted as follows: none.     If you require additional information pleas

## (undated) NOTE — LETTER
6/18/2019      To Whom It May Concern:    Andreea Chi is currently under my medical care and may return to work at this time. Please excuse Raymond for 2 weeks. He may return to school on 06/19/19.   Activity is restricted as follows: light duty, no lifti

## (undated) NOTE — LETTER
7/2/2019          To Whom It May Concern:    Neela Tompkins is currently under my medical care and needs to be off work from July 8th to July 11th 2019. He may return to work on 07/11/19. Activity is restricted as follows: none.     If you require additiona

## (undated) NOTE — LETTER
6/24/2019          To Whom It May Concern:    Stefan Wilson is currently under my medical care and may not return to work at this time. Please excuse Master for 8 days. He may return to work on 7/2/19.   Activity is restricted as follows: to be determined

## (undated) NOTE — LETTER
8/19/2017          To Whom It May Concern:    Ankit Galvez is currently under my medical care and may return to work at this time. He may return to work on 08/21/17.   Activity is restricted as follows: light duty, avoid working in area with dust or flying

## (undated) NOTE — LETTER
6/5/2019        To Whom It May Concern:    Andreea Chi is currently under my medical care and may not return to work at this time. Please excuse Jazmín Leal for 2 weeks. He may return to work on 06/17/19. Activity is restricted as follows: none.     If you r

## (undated) NOTE — LETTER
January 20, 2020     [address]      Dear Ivan Thibodeaux:    Below are the results from your recent visit:    Resulted Orders   Result Value Ref Range    Vitamin D, 25OH, Total 12.7 (L) 30.0 - 100.0 ng/mL     The

## (undated) NOTE — LETTER
8/20/2019          To Whom It May Concern:    Samir Guy is currently under my medical care and may return to work at this time. He may return to work . Activity is restricted as follows: no lifting over 25 lbs.     If you require additional informati

## (undated) NOTE — LETTER
7/24/2017          To Whom It May Concern:    Mari Thorpe is currently under my medical care and may return to work at this time. Please excuse Master for 3+ weeks. He may return to work on 07/25/17. Activity is restricted as follows: light duty.  Unable

## (undated) NOTE — LETTER
7/10/2017          To Whom It May Concern:    Diane Colorado is currently under my medical care and may not return to work at this time. Please excuse Laurita Martínez for 2 weeks. He may return to work on 07/24/17. Activity is restricted as follows: none.     If you